# Patient Record
Sex: FEMALE | Race: WHITE | NOT HISPANIC OR LATINO | URBAN - METROPOLITAN AREA
[De-identification: names, ages, dates, MRNs, and addresses within clinical notes are randomized per-mention and may not be internally consistent; named-entity substitution may affect disease eponyms.]

---

## 2023-02-17 ENCOUNTER — TELEPHONE (OUTPATIENT)
Dept: NEUROLOGY | Facility: CLINIC | Age: 33
End: 2023-02-17

## 2023-02-17 NOTE — TELEPHONE ENCOUNTER
----- Message from Christiane Zavaleta sent at 2/17/2023 11:30 AM CST -----  Contact: Kasey ([facility])-[phone]  Kasey from [facility] is requesting a call back regarding a referral she faxed over on nov 8th 2022. Please call he back with an update at [phone] option 1. Thanks